# Patient Record
Sex: MALE | Race: BLACK OR AFRICAN AMERICAN | NOT HISPANIC OR LATINO | Employment: FULL TIME | URBAN - METROPOLITAN AREA
[De-identification: names, ages, dates, MRNs, and addresses within clinical notes are randomized per-mention and may not be internally consistent; named-entity substitution may affect disease eponyms.]

---

## 2017-11-02 ENCOUNTER — ALLSCRIPTS OFFICE VISIT (OUTPATIENT)
Dept: OTHER | Facility: OTHER | Age: 32
End: 2017-11-02

## 2017-11-02 ENCOUNTER — TRANSCRIBE ORDERS (OUTPATIENT)
Dept: ADMINISTRATIVE | Facility: HOSPITAL | Age: 32
End: 2017-11-02

## 2017-11-02 ENCOUNTER — APPOINTMENT (OUTPATIENT)
Dept: RADIOLOGY | Facility: MEDICAL CENTER | Age: 32
End: 2017-11-02
Payer: COMMERCIAL

## 2017-11-02 DIAGNOSIS — M25.561 PAIN IN RIGHT KNEE: ICD-10-CM

## 2017-11-02 DIAGNOSIS — M25.561 ACUTE PAIN OF RIGHT KNEE: Primary | ICD-10-CM

## 2017-11-02 PROCEDURE — 73562 X-RAY EXAM OF KNEE 3: CPT

## 2017-11-29 ENCOUNTER — GENERIC CONVERSION - ENCOUNTER (OUTPATIENT)
Dept: OBGYN CLINIC | Facility: CLINIC | Age: 32
End: 2017-11-29

## 2017-11-29 ENCOUNTER — APPOINTMENT (OUTPATIENT)
Dept: PHYSICAL THERAPY | Facility: MEDICAL CENTER | Age: 32
End: 2017-11-29
Payer: COMMERCIAL

## 2017-11-29 PROCEDURE — 97161 PT EVAL LOW COMPLEX 20 MIN: CPT

## 2017-11-29 PROCEDURE — G8991 OTHER PT/OT GOAL STATUS: HCPCS

## 2017-11-29 PROCEDURE — G8990 OTHER PT/OT CURRENT STATUS: HCPCS

## 2017-11-30 ENCOUNTER — GENERIC CONVERSION - ENCOUNTER (OUTPATIENT)
Dept: OTHER | Facility: OTHER | Age: 32
End: 2017-11-30

## 2017-12-05 ENCOUNTER — APPOINTMENT (OUTPATIENT)
Dept: PHYSICAL THERAPY | Facility: MEDICAL CENTER | Age: 32
End: 2017-12-05
Payer: COMMERCIAL

## 2017-12-05 PROCEDURE — 97140 MANUAL THERAPY 1/> REGIONS: CPT

## 2017-12-05 PROCEDURE — 97110 THERAPEUTIC EXERCISES: CPT

## 2017-12-06 ENCOUNTER — APPOINTMENT (OUTPATIENT)
Dept: PHYSICAL THERAPY | Facility: MEDICAL CENTER | Age: 32
End: 2017-12-06
Payer: COMMERCIAL

## 2017-12-06 PROCEDURE — 97110 THERAPEUTIC EXERCISES: CPT

## 2017-12-14 ENCOUNTER — APPOINTMENT (OUTPATIENT)
Dept: PHYSICAL THERAPY | Facility: MEDICAL CENTER | Age: 32
End: 2017-12-14
Payer: COMMERCIAL

## 2017-12-19 ENCOUNTER — APPOINTMENT (OUTPATIENT)
Dept: PHYSICAL THERAPY | Facility: MEDICAL CENTER | Age: 32
End: 2017-12-19
Payer: COMMERCIAL

## 2017-12-19 PROCEDURE — 97110 THERAPEUTIC EXERCISES: CPT

## 2017-12-21 ENCOUNTER — APPOINTMENT (OUTPATIENT)
Dept: PHYSICAL THERAPY | Facility: MEDICAL CENTER | Age: 32
End: 2017-12-21
Payer: COMMERCIAL

## 2017-12-21 PROCEDURE — 97110 THERAPEUTIC EXERCISES: CPT

## 2018-01-02 ENCOUNTER — APPOINTMENT (OUTPATIENT)
Dept: PHYSICAL THERAPY | Facility: MEDICAL CENTER | Age: 33
End: 2018-01-02
Payer: COMMERCIAL

## 2018-01-03 ENCOUNTER — APPOINTMENT (OUTPATIENT)
Dept: PHYSICAL THERAPY | Facility: MEDICAL CENTER | Age: 33
End: 2018-01-03
Payer: COMMERCIAL

## 2018-01-04 ENCOUNTER — APPOINTMENT (OUTPATIENT)
Dept: PHYSICAL THERAPY | Facility: MEDICAL CENTER | Age: 33
End: 2018-01-04
Payer: COMMERCIAL

## 2018-01-09 ENCOUNTER — APPOINTMENT (OUTPATIENT)
Dept: PHYSICAL THERAPY | Facility: MEDICAL CENTER | Age: 33
End: 2018-01-09
Payer: COMMERCIAL

## 2018-01-09 PROCEDURE — 97110 THERAPEUTIC EXERCISES: CPT

## 2018-01-09 PROCEDURE — 97112 NEUROMUSCULAR REEDUCATION: CPT

## 2018-01-11 ENCOUNTER — APPOINTMENT (OUTPATIENT)
Dept: PHYSICAL THERAPY | Facility: MEDICAL CENTER | Age: 33
End: 2018-01-11
Payer: COMMERCIAL

## 2018-01-11 PROCEDURE — 97110 THERAPEUTIC EXERCISES: CPT

## 2018-01-11 PROCEDURE — G8991 OTHER PT/OT GOAL STATUS: HCPCS

## 2018-01-11 PROCEDURE — 97140 MANUAL THERAPY 1/> REGIONS: CPT

## 2018-01-11 PROCEDURE — 97112 NEUROMUSCULAR REEDUCATION: CPT

## 2018-01-11 PROCEDURE — G8990 OTHER PT/OT CURRENT STATUS: HCPCS

## 2018-01-16 ENCOUNTER — APPOINTMENT (OUTPATIENT)
Dept: PHYSICAL THERAPY | Facility: MEDICAL CENTER | Age: 33
End: 2018-01-16
Payer: COMMERCIAL

## 2018-01-18 ENCOUNTER — APPOINTMENT (OUTPATIENT)
Dept: PHYSICAL THERAPY | Facility: MEDICAL CENTER | Age: 33
End: 2018-01-18
Payer: COMMERCIAL

## 2018-01-22 VITALS
WEIGHT: 211 LBS | DIASTOLIC BLOOD PRESSURE: 86 MMHG | SYSTOLIC BLOOD PRESSURE: 130 MMHG | BODY MASS INDEX: 29.54 KG/M2 | HEIGHT: 71 IN | HEART RATE: 80 BPM

## 2018-01-23 ENCOUNTER — APPOINTMENT (OUTPATIENT)
Dept: PHYSICAL THERAPY | Facility: MEDICAL CENTER | Age: 33
End: 2018-01-23
Payer: COMMERCIAL

## 2018-01-25 ENCOUNTER — APPOINTMENT (OUTPATIENT)
Dept: PHYSICAL THERAPY | Facility: MEDICAL CENTER | Age: 33
End: 2018-01-25
Payer: COMMERCIAL

## 2018-02-01 ENCOUNTER — OFFICE VISIT (OUTPATIENT)
Dept: OBGYN CLINIC | Facility: MEDICAL CENTER | Age: 33
End: 2018-02-01
Payer: COMMERCIAL

## 2018-02-01 VITALS
HEART RATE: 79 BPM | HEIGHT: 72 IN | BODY MASS INDEX: 29.26 KG/M2 | DIASTOLIC BLOOD PRESSURE: 63 MMHG | SYSTOLIC BLOOD PRESSURE: 117 MMHG | WEIGHT: 216 LBS

## 2018-02-01 DIAGNOSIS — M25.561 RIGHT KNEE PAIN, UNSPECIFIED CHRONICITY: Primary | ICD-10-CM

## 2018-02-01 PROCEDURE — 99212 OFFICE O/P EST SF 10 MIN: CPT | Performed by: ORTHOPAEDIC SURGERY

## 2018-02-01 NOTE — PROGRESS NOTES
Assessment:  1  Right knee pain, unspecified chronicity  MRI knee right  wo contrast     Patient Active Problem List   Diagnosis    Right knee pain           Plan      MRI right knee follow up once the MRI is done            Subjective:     Patient ID:    Chief Complaint:Zoran Morrison 27 y o  male      HPI    Patient comes in today with regards to his right knee  We had suspected a meniscus tear but was denied by the insurance company because he did not have physical therapy  Unfortunately the insurance did not trust this doctors medical decision making and required him to go to physical therapy  The patient has gone to physical therapy comes back today knee still having pain and showing signs of meniscal irritation  The following portions of the patient's history were reviewed and updated as appropriate: allergies, current medications, past family history, past social history, past surgical history and problem list         Social History     Social History    Marital status: /Civil Union     Spouse name: N/A    Number of children: N/A    Years of education: N/A     Occupational History    Not on file  Social History Main Topics    Smoking status: Never Smoker    Smokeless tobacco: Never Used    Alcohol use Yes      Comment: occasionally    Drug use: No    Sexual activity: Not on file     Other Topics Concern    Not on file     Social History Narrative    No narrative on file     History reviewed  No pertinent past medical history  History reviewed  No pertinent surgical history  No Known Allergies  No current outpatient prescriptions on file prior to visit  No current facility-administered medications on file prior to visit  Objective:    Review of Systems    Ortho Exam    Physical Exam no audible wheeze no shortness of breath no appreciable erythema  Examination the patient he has positive bounce test positive Josesito's test both medial and laterally  Standing pivot with knee flexion increases pain as well  He has occasional giving out sensation as well

## 2018-02-15 ENCOUNTER — HOSPITAL ENCOUNTER (OUTPATIENT)
Dept: MRI IMAGING | Facility: HOSPITAL | Age: 33
Discharge: HOME | End: 2018-02-15
Attending: ORTHOPAEDIC SURGERY
Payer: COMMERCIAL

## 2018-02-15 DIAGNOSIS — M25.561 RIGHT KNEE PAIN, UNSPECIFIED CHRONICITY: ICD-10-CM

## 2018-02-15 PROCEDURE — 73721 MRI JNT OF LWR EXTRE W/O DYE: CPT

## 2018-02-22 ENCOUNTER — APPOINTMENT (OUTPATIENT)
Dept: LAB | Facility: MEDICAL CENTER | Age: 33
End: 2018-02-22
Payer: COMMERCIAL

## 2018-02-22 ENCOUNTER — OFFICE VISIT (OUTPATIENT)
Dept: OBGYN CLINIC | Facility: MEDICAL CENTER | Age: 33
End: 2018-02-22
Payer: COMMERCIAL

## 2018-02-22 ENCOUNTER — CLINICAL SUPPORT (OUTPATIENT)
Dept: URGENT CARE | Facility: MEDICAL CENTER | Age: 33
End: 2018-02-22
Payer: COMMERCIAL

## 2018-02-22 VITALS
WEIGHT: 216 LBS | HEIGHT: 72 IN | DIASTOLIC BLOOD PRESSURE: 74 MMHG | BODY MASS INDEX: 29.26 KG/M2 | HEART RATE: 74 BPM | SYSTOLIC BLOOD PRESSURE: 125 MMHG

## 2018-02-22 DIAGNOSIS — S83.241A OTHER TEAR OF MEDIAL MENISCUS, CURRENT INJURY, RIGHT KNEE, INITIAL ENCOUNTER: ICD-10-CM

## 2018-02-22 DIAGNOSIS — S83.241A OTHER TEAR OF MEDIAL MENISCUS, CURRENT INJURY, RIGHT KNEE, INITIAL ENCOUNTER: Primary | ICD-10-CM

## 2018-02-22 DIAGNOSIS — S83.281A OTHER TEAR OF LATERAL MENISCUS OF RIGHT KNEE, UNSPECIFIED WHETHER OLD OR CURRENT TEAR, INITIAL ENCOUNTER: ICD-10-CM

## 2018-02-22 LAB
ANION GAP SERPL CALCULATED.3IONS-SCNC: 5 MMOL/L (ref 4–13)
ATRIAL RATE: 68 BPM
BUN SERPL-MCNC: 17 MG/DL (ref 5–25)
CALCIUM SERPL-MCNC: 9.7 MG/DL (ref 8.3–10.1)
CHLORIDE SERPL-SCNC: 101 MMOL/L (ref 100–108)
CO2 SERPL-SCNC: 32 MMOL/L (ref 21–32)
CREAT SERPL-MCNC: 1.37 MG/DL (ref 0.6–1.3)
ERYTHROCYTE [DISTWIDTH] IN BLOOD BY AUTOMATED COUNT: 13 % (ref 11.6–15.1)
GFR SERPL CREATININE-BSD FRML MDRD: 67 ML/MIN/1.73SQ M
GLUCOSE P FAST SERPL-MCNC: 72 MG/DL (ref 65–99)
HCT VFR BLD AUTO: 44.3 % (ref 36.5–49.3)
HGB BLD-MCNC: 15.3 G/DL (ref 12–17)
MCH RBC QN AUTO: 29.3 PG (ref 26.8–34.3)
MCHC RBC AUTO-ENTMCNC: 34.5 G/DL (ref 31.4–37.4)
MCV RBC AUTO: 85 FL (ref 82–98)
P AXIS: 72 DEGREES
PLATELET # BLD AUTO: 289 THOUSANDS/UL (ref 149–390)
PMV BLD AUTO: 10.1 FL (ref 8.9–12.7)
POTASSIUM SERPL-SCNC: 3.7 MMOL/L (ref 3.5–5.3)
PR INTERVAL: 176 MS
QRS AXIS: 22 DEGREES
QRSD INTERVAL: 94 MS
QT INTERVAL: 360 MS
QTC INTERVAL: 382 MS
RBC # BLD AUTO: 5.22 MILLION/UL (ref 3.88–5.62)
SODIUM SERPL-SCNC: 138 MMOL/L (ref 136–145)
T WAVE AXIS: 34 DEGREES
VENTRICULAR RATE: 68 BPM
WBC # BLD AUTO: 4.19 THOUSAND/UL (ref 4.31–10.16)

## 2018-02-22 PROCEDURE — 99213 OFFICE O/P EST LOW 20 MIN: CPT | Performed by: ORTHOPAEDIC SURGERY

## 2018-02-22 PROCEDURE — 85027 COMPLETE CBC AUTOMATED: CPT

## 2018-02-22 PROCEDURE — 93010 ELECTROCARDIOGRAM REPORT: CPT | Performed by: INTERNAL MEDICINE

## 2018-02-22 PROCEDURE — 80048 BASIC METABOLIC PNL TOTAL CA: CPT

## 2018-02-22 PROCEDURE — 93005 ELECTROCARDIOGRAM TRACING: CPT

## 2018-02-22 PROCEDURE — 36415 COLL VENOUS BLD VENIPUNCTURE: CPT

## 2018-02-22 NOTE — PROGRESS NOTES
Patient Name:  Jay Ennis  MRN:  [id number]    Assessment & Plan    Right knee medial and lateral meniscus tears  1  Discussed treatment options with the patient  He has elected to proceed with right knee arthroscopic partial medial and lateral meniscectomies  2  Follow up 10-14 days after surgical date    Chief Complaint    Follow-up right knee pain      History of the Present Illness    75-year-old male returns to the office today for follow-up regarding his right knee  He recently underwent an MRI and is here to review the results  He notes persistent right knee pain  He states the pain is generalized  Pain is worse with ambulation, walking up and down steps, and pivoting  He notes subjective weakness and instability as well  No numbness or tingling  He utilizes a brace with minimal relief  No fevers or chills  Physical Exam    /74   Pulse 74   Ht 5' 11 5" (1 816 m)   Wt 98 kg (216 lb)   BMI 29 71 kg/m²     Right knee:  No gross deformity  Skin intact  Trace effusion  No erythema or ecchymosis  Tenderness to palpation medial and lateral joint line  Full range of motion without discomfort  Stable to varus and valgus stress  Positive Josesito's test   Sensation intact right lower extremity  Constitutional:  Well-developed and well-nourished  Eyes:  Anicteric sclerae  Neck:  Supple  Lungs:  Unlabored breathing  Cardiovascular:  Capillary refill is less than 2 seconds  Skin:  Intact without erythema  Neurologic:  Sensation intact to light touch  Psychiatric:  Mood and affect are appropriate  Data Review    I have personally reviewed pertinent films in PACS, and my interpretation follows  MRI of the right knee reveals a tear of the anterior horn of the lateral meniscus  There is also a horizontal tear of the posterior horn of the medial meniscus  Large medial meniscus present with the possibility of a discoid meniscus  History reviewed   No pertinent past medical history  History reviewed  No pertinent surgical history  No Known Allergies    No current outpatient prescriptions on file prior to visit  No current facility-administered medications on file prior to visit  Social History   Substance Use Topics    Smoking status: Never Smoker    Smokeless tobacco: Never Used    Alcohol use Yes      Comment: occasionally       History reviewed  No pertinent family history  Review of Systems    General:  Negative for fever, lethargy/malaise, or night sweats  Eyes:  Negative for blurry vision or double vision  ENT:  Negative for hearing change, nasal discharge, or sore throat  Hematological:  Negative for bleeding problems or blood clots  Endocrine:  Negative for excessive thirst or temperature intolerance  Respiratory:  Negative for cough or wheezing  Cardiovascular:  Negative for chest pain, dyspnea on exertion, or palpitations  Gastrointestinal:  Negative for abdominal pain, diarrhea, or nausea/vomiting  Musculoskeletal:  As stated in the HPI and otherwise negative  Neurological:  Negative for confusion, headaches, or seizures  Psychological:  Negative for hallucinations or mood swings  Dermatological:  Negative for itching or rash        Scribe Attestation    I,:   Dennise Joseph PA-C am acting as a scribe while in the presence of the attending physician :        I,:   Ray Turcios DO personally performed the services described in this documentation    as scribed in my presence :

## 2018-02-22 NOTE — H&P
Create Note      My Note 10:31 AM   Edit           Expand All Collapse All    Patient Name:  Santana Carreno  MRN:  [id number]     Assessment & Plan     Right knee medial and lateral meniscus tears  1  Discussed treatment options with the patient  He has elected to proceed with right knee arthroscopic partial medial and lateral meniscectomies  2  Follow up 10-14 days after surgical date     Chief Complaint     Follow-up right knee pain        History of the Present Illness     70-year-old male returns to the office today for follow-up regarding his right knee  He recently underwent an MRI and is here to review the results  He notes persistent right knee pain  He states the pain is generalized  Pain is worse with ambulation, walking up and down steps, and pivoting  He notes subjective weakness and instability as well  No numbness or tingling  He utilizes a brace with minimal relief  No fevers or chills         Physical Exam     /74   Pulse 74   Ht 5' 11 5" (1 816 m)   Wt 98 kg (216 lb)   BMI 29 71 kg/m²      Right knee:  No gross deformity  Skin intact  Trace effusion  No erythema or ecchymosis  Tenderness to palpation medial and lateral joint line  Full range of motion without discomfort  Stable to varus and valgus stress  Positive Josesito's test   Sensation intact right lower extremity      Constitutional:  Well-developed and well-nourished  Eyes:  Anicteric sclerae  Neck:  Supple  Lungs:  Unlabored breathing  Cardiovascular:  Capillary refill is less than 2 seconds  Skin:  Intact without erythema  Neurologic:  Sensation intact to light touch  Psychiatric:  Mood and affect are appropriate         Data Review     I have personally reviewed pertinent films in PACS, and my interpretation follows      MRI of the right knee reveals a tear of the anterior horn of the lateral meniscus  There is also a horizontal tear of the posterior horn of the medial meniscus    Large medial meniscus present with the possibility of a discoid meniscus         Medical History   History reviewed  No pertinent past medical history         Surgical History   History reviewed  No pertinent surgical history         No Known Allergies     No current outpatient prescriptions on file prior to visit       No current facility-administered medications on file prior to visit                   Social History    Substance Use Topics     Smoking status: Never Smoker     Smokeless tobacco: Never Used     Alcohol use Yes         Comment: occasionally          History reviewed  No pertinent family history         Review of Systems     General:  Negative for fever, lethargy/malaise, or night sweats  Eyes:  Negative for blurry vision or double vision  ENT:  Negative for hearing change, nasal discharge, or sore throat  Hematological:  Negative for bleeding problems or blood clots  Endocrine:  Negative for excessive thirst or temperature intolerance  Respiratory:  Negative for cough or wheezing  Cardiovascular:  Negative for chest pain, dyspnea on exertion, or palpitations  Gastrointestinal:  Negative for abdominal pain, diarrhea, or nausea/vomiting  Musculoskeletal:  As stated in the HPI and otherwise negative  Neurological:  Negative for confusion, headaches, or seizures  Psychological:  Negative for hallucinations or mood swings  Dermatological:  Negative for itching or rash              Scribe Attestation    I,:   Anjana Mendoza PA-C am acting as a scribe while in the presence of the attending physician :        I,:   Sharath Campbell DO personally performed the services described in this documentation    as scribed in my presence  :

## 2018-02-23 LAB
ATRIAL RATE: 63 BPM
P AXIS: 72 DEGREES
PR INTERVAL: 176 MS
QRS AXIS: 22 DEGREES
QRSD INTERVAL: 90 MS
QT INTERVAL: 362 MS
QTC INTERVAL: 370 MS
T WAVE AXIS: 35 DEGREES
VENTRICULAR RATE: 63 BPM

## 2018-03-01 NOTE — PRE-PROCEDURE INSTRUCTIONS
No outpatient prescriptions have been marked as taking for the 3/13/18 encounter [facility] Encounter)  Pre op and bathing instructions reviewed   Pt will get hibiclens

## 2018-03-13 ENCOUNTER — HOSPITAL ENCOUNTER (OUTPATIENT)
Facility: HOSPITAL | Age: 33
Setting detail: OUTPATIENT SURGERY
Discharge: HOME | End: 2018-03-13
Attending: ORTHOPAEDIC SURGERY | Admitting: ORTHOPAEDIC SURGERY
Payer: COMMERCIAL

## 2018-03-13 ENCOUNTER — ANESTHESIA (OUTPATIENT)
Dept: PERIOP | Facility: HOSPITAL | Age: 33
End: 2018-03-13
Payer: COMMERCIAL

## 2018-03-13 ENCOUNTER — ANESTHESIA EVENT (OUTPATIENT)
Dept: PERIOP | Facility: HOSPITAL | Age: 33
End: 2018-03-13
Payer: COMMERCIAL

## 2018-03-13 VITALS
BODY MASS INDEX: 30.1 KG/M2 | TEMPERATURE: 97.3 F | OXYGEN SATURATION: 97 % | HEIGHT: 71 IN | HEART RATE: 60 BPM | RESPIRATION RATE: 18 BRPM | SYSTOLIC BLOOD PRESSURE: 142 MMHG | DIASTOLIC BLOOD PRESSURE: 81 MMHG | WEIGHT: 215 LBS

## 2018-03-13 DIAGNOSIS — S83.281A TEAR OF LATERAL MENISCUS OF RIGHT KNEE, UNSPECIFIED TEAR TYPE, UNSPECIFIED WHETHER OLD OR CURRENT TEAR, INITIAL ENCOUNTER: ICD-10-CM

## 2018-03-13 DIAGNOSIS — S83.241A OTHER TEAR OF MEDIAL MENISCUS, CURRENT INJURY, RIGHT KNEE, INITIAL ENCOUNTER: Primary | ICD-10-CM

## 2018-03-13 PROCEDURE — 29881 ARTHRS KNE SRG MNISECTMY M/L: CPT | Performed by: PHYSICIAN ASSISTANT

## 2018-03-13 PROCEDURE — 29881 ARTHRS KNE SRG MNISECTMY M/L: CPT | Performed by: ORTHOPAEDIC SURGERY

## 2018-03-13 RX ORDER — PROPOFOL 10 MG/ML
INJECTION, EMULSION INTRAVENOUS AS NEEDED
Status: DISCONTINUED | OUTPATIENT
Start: 2018-03-13 | End: 2018-03-13 | Stop reason: SURG

## 2018-03-13 RX ORDER — ONDANSETRON 2 MG/ML
INJECTION INTRAMUSCULAR; INTRAVENOUS AS NEEDED
Status: DISCONTINUED | OUTPATIENT
Start: 2018-03-13 | End: 2018-03-13 | Stop reason: SURG

## 2018-03-13 RX ORDER — OXYCODONE HYDROCHLORIDE 5 MG/1
10 TABLET ORAL EVERY 4 HOURS PRN
Status: DISCONTINUED | OUTPATIENT
Start: 2018-03-13 | End: 2018-03-13 | Stop reason: HOSPADM

## 2018-03-13 RX ORDER — ONDANSETRON 2 MG/ML
4 INJECTION INTRAMUSCULAR; INTRAVENOUS EVERY 6 HOURS PRN
Status: DISCONTINUED | OUTPATIENT
Start: 2018-03-13 | End: 2018-03-13 | Stop reason: HOSPADM

## 2018-03-13 RX ORDER — MORPHINE SULFATE 4 MG/ML
2 INJECTION, SOLUTION INTRAMUSCULAR; INTRAVENOUS EVERY 2 HOUR PRN
Status: DISCONTINUED | OUTPATIENT
Start: 2018-03-13 | End: 2018-03-13 | Stop reason: HOSPADM

## 2018-03-13 RX ORDER — FENTANYL CITRATE 50 UG/ML
INJECTION, SOLUTION INTRAMUSCULAR; INTRAVENOUS AS NEEDED
Status: DISCONTINUED | OUTPATIENT
Start: 2018-03-13 | End: 2018-03-13 | Stop reason: SURG

## 2018-03-13 RX ORDER — ONDANSETRON 2 MG/ML
4 INJECTION INTRAMUSCULAR; INTRAVENOUS ONCE AS NEEDED
Status: DISCONTINUED | OUTPATIENT
Start: 2018-03-13 | End: 2018-03-13 | Stop reason: HOSPADM

## 2018-03-13 RX ORDER — BUPIVACAINE HYDROCHLORIDE AND EPINEPHRINE 5; 5 MG/ML; UG/ML
INJECTION, SOLUTION EPIDURAL; INTRACAUDAL; PERINEURAL AS NEEDED
Status: DISCONTINUED | OUTPATIENT
Start: 2018-03-13 | End: 2018-03-13 | Stop reason: HOSPADM

## 2018-03-13 RX ORDER — MIDAZOLAM HYDROCHLORIDE 1 MG/ML
INJECTION INTRAMUSCULAR; INTRAVENOUS AS NEEDED
Status: DISCONTINUED | OUTPATIENT
Start: 2018-03-13 | End: 2018-03-13 | Stop reason: SURG

## 2018-03-13 RX ORDER — OXYCODONE HYDROCHLORIDE AND ACETAMINOPHEN 5; 325 MG/1; MG/1
1 TABLET ORAL EVERY 6 HOURS PRN
Qty: 10 TABLET | Refills: 0 | Status: SHIPPED | OUTPATIENT
Start: 2018-03-13 | End: 2018-03-23

## 2018-03-13 RX ORDER — FENTANYL CITRATE/PF 50 MCG/ML
25 SYRINGE (ML) INJECTION
Status: DISCONTINUED | OUTPATIENT
Start: 2018-03-13 | End: 2018-03-13 | Stop reason: HOSPADM

## 2018-03-13 RX ORDER — SODIUM CHLORIDE 9 MG/ML
INJECTION, SOLUTION INTRAVENOUS CONTINUOUS PRN
Status: DISCONTINUED | OUTPATIENT
Start: 2018-03-13 | End: 2018-03-13 | Stop reason: SURG

## 2018-03-13 RX ORDER — LIDOCAINE HYDROCHLORIDE 10 MG/ML
INJECTION, SOLUTION INFILTRATION; PERINEURAL AS NEEDED
Status: DISCONTINUED | OUTPATIENT
Start: 2018-03-13 | End: 2018-03-13 | Stop reason: SURG

## 2018-03-13 RX ORDER — OXYCODONE HYDROCHLORIDE 5 MG/1
5 TABLET ORAL EVERY 4 HOURS PRN
Status: DISCONTINUED | OUTPATIENT
Start: 2018-03-13 | End: 2018-03-13 | Stop reason: HOSPADM

## 2018-03-13 RX ADMIN — PROPOFOL 300 MG: 10 INJECTION, EMULSION INTRAVENOUS at 08:24

## 2018-03-13 RX ADMIN — FENTANYL CITRATE 50 MCG: 50 INJECTION INTRAMUSCULAR; INTRAVENOUS at 08:39

## 2018-03-13 RX ADMIN — DEXAMETHASONE SODIUM PHOSPHATE 10 MG: 10 INJECTION INTRAMUSCULAR; INTRAVENOUS at 08:24

## 2018-03-13 RX ADMIN — CEFAZOLIN SODIUM 2000 MG: 2 SOLUTION INTRAVENOUS at 08:24

## 2018-03-13 RX ADMIN — ONDANSETRON 4 MG: 2 INJECTION INTRAMUSCULAR; INTRAVENOUS at 08:24

## 2018-03-13 RX ADMIN — LIDOCAINE HYDROCHLORIDE 50 MG: 10 INJECTION, SOLUTION INFILTRATION; PERINEURAL at 08:24

## 2018-03-13 RX ADMIN — MIDAZOLAM HYDROCHLORIDE 2 MG: 1 INJECTION, SOLUTION INTRAMUSCULAR; INTRAVENOUS at 08:19

## 2018-03-13 RX ADMIN — SODIUM CHLORIDE: 0.9 INJECTION, SOLUTION INTRAVENOUS at 08:08

## 2018-03-13 NOTE — ANESTHESIA PREPROCEDURE EVALUATION
Review of Systems/Medical History  Patient summary reviewed  Chart reviewed      Cardiovascular  Negative cardio ROS    Pulmonary  Negative pulmonary ROS        GI/Hepatic    No GERD ,        Negative  ROS        Endo/Other  Negative endo/other ROS      GYN       Hematology  Negative hematology ROS      Musculoskeletal  Negative musculoskeletal ROS        Neurology  Negative neurology ROS      Psychology   Negative psychology ROS              Physical Exam    Airway    Mallampati score: I  TM Distance: >3 FB  Neck ROM: full     Dental   No notable dental hx     Cardiovascular  Comment: Negative ROS, Cardiovascular exam normal    Pulmonary  Pulmonary exam normal     Other Findings        Anesthesia Plan  ASA Score- 1     Anesthesia Type- general with ASA Monitors  Additional Monitors:   Airway Plan: LMA  Plan Factors-  Patient did not smoke on day of surgery  Induction- intravenous  Postoperative Plan-     Informed Consent- Anesthetic plan and risks discussed with patient  I personally reviewed this patient with the CRNA  Discussed and agreed on the Anesthesia Plan with the CRNA  Ba Martinez

## 2018-03-13 NOTE — DISCHARGE INSTRUCTIONS
Keep dressings clean and dry for 2- 3 days  May remove dressings in three days  May shower if incisions dry  Please clean incisions with alcohol after showers  Weight bearing as tolerated  Apply band-aide if needed  Utilize naproxen or Aleve as her primary source of pain relief use the pain medicines prescribed if needed and sparingly

## 2018-03-13 NOTE — H&P (VIEW-ONLY)
Patient Name:  Obie Prader  MRN:  [id number]    Assessment & Plan    Right knee medial and lateral meniscus tears  1  Discussed treatment options with the patient  He has elected to proceed with right knee arthroscopic partial medial and lateral meniscectomies  2  Follow up 10-14 days after surgical date    Chief Complaint    Follow-up right knee pain      History of the Present Illness    24-year-old male returns to the office today for follow-up regarding his right knee  He recently underwent an MRI and is here to review the results  He notes persistent right knee pain  He states the pain is generalized  Pain is worse with ambulation, walking up and down steps, and pivoting  He notes subjective weakness and instability as well  No numbness or tingling  He utilizes a brace with minimal relief  No fevers or chills  Physical Exam    /74   Pulse 74   Ht 5' 11 5" (1 816 m)   Wt 98 kg (216 lb)   BMI 29 71 kg/m²     Right knee:  No gross deformity  Skin intact  Trace effusion  No erythema or ecchymosis  Tenderness to palpation medial and lateral joint line  Full range of motion without discomfort  Stable to varus and valgus stress  Positive Josesito's test   Sensation intact right lower extremity  Constitutional:  Well-developed and well-nourished  Eyes:  Anicteric sclerae  Neck:  Supple  Lungs:  Unlabored breathing  Cardiovascular:  Capillary refill is less than 2 seconds  Skin:  Intact without erythema  Neurologic:  Sensation intact to light touch  Psychiatric:  Mood and affect are appropriate  Data Review    I have personally reviewed pertinent films in PACS, and my interpretation follows  MRI of the right knee reveals a tear of the anterior horn of the lateral meniscus  There is also a horizontal tear of the posterior horn of the medial meniscus  Large medial meniscus present with the possibility of a discoid meniscus  History reviewed   No pertinent past medical history  History reviewed  No pertinent surgical history  No Known Allergies    No current outpatient prescriptions on file prior to visit  No current facility-administered medications on file prior to visit  Social History   Substance Use Topics    Smoking status: Never Smoker    Smokeless tobacco: Never Used    Alcohol use Yes      Comment: occasionally       History reviewed  No pertinent family history  Review of Systems    General:  Negative for fever, lethargy/malaise, or night sweats  Eyes:  Negative for blurry vision or double vision  ENT:  Negative for hearing change, nasal discharge, or sore throat  Hematological:  Negative for bleeding problems or blood clots  Endocrine:  Negative for excessive thirst or temperature intolerance  Respiratory:  Negative for cough or wheezing  Cardiovascular:  Negative for chest pain, dyspnea on exertion, or palpitations  Gastrointestinal:  Negative for abdominal pain, diarrhea, or nausea/vomiting  Musculoskeletal:  As stated in the HPI and otherwise negative  Neurological:  Negative for confusion, headaches, or seizures  Psychological:  Negative for hallucinations or mood swings  Dermatological:  Negative for itching or rash        Scribe Attestation    I,:   Brenda Hurtado PA-C am acting as a scribe while in the presence of the attending physician :        I,:   Amarilis Wong DO personally performed the services described in this documentation    as scribed in my presence :

## 2018-04-16 ENCOUNTER — OFFICE VISIT (OUTPATIENT)
Dept: OBGYN CLINIC | Facility: CLINIC | Age: 33
End: 2018-04-16

## 2018-04-16 VITALS
DIASTOLIC BLOOD PRESSURE: 70 MMHG | WEIGHT: 216 LBS | HEART RATE: 63 BPM | SYSTOLIC BLOOD PRESSURE: 111 MMHG | HEIGHT: 72 IN | BODY MASS INDEX: 29.26 KG/M2

## 2018-04-16 DIAGNOSIS — M25.562 ACUTE PAIN OF LEFT KNEE: Primary | ICD-10-CM

## 2018-04-16 PROCEDURE — 99024 POSTOP FOLLOW-UP VISIT: CPT | Performed by: ORTHOPAEDIC SURGERY

## 2018-04-16 NOTE — PROGRESS NOTES
27 y o male Is 5 weeks status post left knee arthroscopy  Presents today for his first postoperative check is he's had multiple missed or rescheduled appointments  He has no complaints and is feeling well  Review of Systems  Review of systems negative unless otherwise specified in HPI    Past Medical History  History reviewed  No pertinent past medical history  Past Surgical History  Past Surgical History:   Procedure Laterality Date    NO PAST SURGERIES      AL KNEE SCOPE,SINGLE MENISECTOMY Right 3/13/2018    Procedure: KNEE ARTHROSCOPIC PARTIAL MEDIAL AND LATERAL MENISCECTOMIES;  Surgeon: Tim Cortez DO;  Location: [facility];  Service: Orthopedics       Current Medications  No current outpatient prescriptions on file prior to visit  No current facility-administered medications on file prior to visit  Recent Labs [facility])    0  Lab Value Date/Time   HCT 44 3 02/22/2018 1159   HGB 15 3 02/22/2018 1159   WBC 4 19 (L) 02/22/2018 1159         Physical exam  · General: Awake, Alert, Oriented  · Eyes: Pupils equal, round and reactive to light  · Heart: regular rate and rhythm  · Lungs: No audible wheezing  · Abdomen: soft  Portal sites are well-healed  He has full extension and full flexion  No knee effusion is present Is non-tender and is soft  Imaging  None    Procedure  None    Assessment/Plan:   32 y o male Status post left knee arthroscopy done 5 weeks ago  Stable postoperative course  He'll be released from our care and will follow up with us when necessary basis  He may return to full activities without restriction

## 2024-09-06 NOTE — OP NOTE
Goals:   Change toast to whole grain tortilla wraps.  Protein source at each meal.  1 cup vegetables at lunch and dinner - no corn, peas, or potatoes.   Decrease snacks to once daily consisting of protein and fiber such as fruit + cheese, vegetable + PB, nuts + fruit.   Begin implementing emotional eating questions to assist with reassessing circumstances. Journal occasions to assist with counseling sessions.   Remove soda, white chocolate tea, and honey.  Begin tracking intake via food log.    OPERATIVE REPORT  PATIENT NAME: Paddy Kumar    :  1985  MRN: [id number]  Pt Location: [facility] ROOM 01    SURGERY DATE: 3/13/2018    Surgeon(s) and Role:     * Olivia Juarez DO - Primary  [facility] assistive with prepping draping positioning wound closure and dressing application     Preop Diagnosis:  Other tear of medial meniscus, current injury, right knee, initial encounter [S83 241A]  Other tear of lateral meniscus of right knee, unspecified whether old or current tear, initial encounter [S83 281A]    Post-Op Diagnosis Codes:          * Other tear of lateral meniscus of right knee, unspecified whether old or current tear, initial encounter [S83 281A]    Procedure   lateral meniscectomy plica resection    Specimen(s):  * No specimens in log *    Estimated Blood Loss:   Minimal    Drains:       Anesthesia Type:   General    Operative Indications: Other tear of medial meniscus, current injury, right knee, initial encounter [S83 241A]  Other tear of lateral meniscus of right knee, unspecified whether old or current tear, initial encounter [S83 281A]      Operative Findings:   the medial meniscus was intact in fact there was no medial meniscus tear there is some mild inflammation around the posterior horn but no tisha tear  The lateral meniscus had a tear and there is a small medial plica    Complications:   None    Procedure and Technique:  Patient was seen and examined in the preoperative area  The right lower extremity was marked, the consent an H&P had been reviewed  The patient was brought back to the operative suite  The patient was intubated sedated  The right lower extremity was prepped and draped in a sterile fashion  After proper timeout commenced and identified the right lower extremity as the operative site  Injection of quarter percent Marcaine with epinephrine was injected in both medial lateral portal site  Incision over the lateral portal was made with eleven blade    We performed a diagnostic arthroscopy  We used the arthroscope and spinal needle to located our medial portal, and made incision with 11 blade and dilated with hemostat  We completed our diagnostic arthroscopy  We found  Some mild inflammation along the posterior horn of the meniscus medially but no tear the lateral meniscus did show tearing    We debrided our meniscus back to a stable by using electro cautery device, shaver, and biters  We excised our plica We copiously irrigated the wound  We closed the incision with 3-0 nylon  Xeroform was applied to the incisions  4 x 4's ABDs , web roll and an Ace wrap were applied to right lower extremity  The patient was taken back to PACU after anesthesia was reversed        I was present for the entire procedure and A qualified resident physician was not available    Patient Disposition:  PACU     SIGNATURE: Jamir Gupta DO  DATE: March 13, 2018  TIME: 8:11 AM

## (undated) DEVICE — BLADE SHAVER DISSECTOR 4MM 13CM COOLCUT

## (undated) DEVICE — GLOVE SRG BIOGEL ECLIPSE 8

## (undated) DEVICE — ACE WRAP 4 IN STERILE

## (undated) DEVICE — SCD SEQUENTIAL COMPRESSION COMFORT SLEEVE MEDIUM KNEE LENGTH: Brand: KENDALL SCD

## (undated) DEVICE — SUT ETHILON 3-0 PS-1 18 IN 1663G

## (undated) DEVICE — 3M™ STERI-STRIP™ REINFORCED ADHESIVE SKIN CLOSURES, R1547, 1/2 IN X 4 IN (12 MM X 100 MM), 6 STRIPS/ENVELOPE: Brand: 3M™ STERI-STRIP™

## (undated) DEVICE — SYRINGE 10ML LL

## (undated) DEVICE — ABDOMINAL PAD: Brand: DERMACEA

## (undated) DEVICE — CHLORAPREP HI-LITE 26ML ORANGE

## (undated) DEVICE — ACE WRAP 6 IN STERILE

## (undated) DEVICE — TUBING SUCTION 5MM X 12 FT

## (undated) DEVICE — VIAL DECANTER

## (undated) DEVICE — PADDING CAST 4 IN  COTTON STRL

## (undated) DEVICE — PACK UNIVERSAL ARTHRSCOPY PBDS

## (undated) DEVICE — IMPERVIOUS STOCKINETTE: Brand: DEROYAL

## (undated) DEVICE — INTENDED FOR TISSUE SEPARATION, AND OTHER PROCEDURES THAT REQUIRE A SHARP SURGICAL BLADE TO PUNCTURE OR CUT.: Brand: BARD-PARKER ® CARBON RIB-BACK BLADES

## (undated) DEVICE — OCCLUSIVE GAUZE STRIP,3% BISMUTH TRIBROMOPHENATE IN PETROLATUM BLEND: Brand: XEROFORM

## (undated) DEVICE — GLOVE INDICATOR PI UNDERGLOVE SZ 8 BLUE

## (undated) DEVICE — TUBING ARTHROSCOPY REDUCE PATIENT

## (undated) DEVICE — GAUZE SPONGES,16 PLY: Brand: CURITY

## (undated) DEVICE — NEEDLE 22 G X 1 1/2 SAFETY